# Patient Record
Sex: FEMALE | Race: WHITE | URBAN - METROPOLITAN AREA
[De-identification: names, ages, dates, MRNs, and addresses within clinical notes are randomized per-mention and may not be internally consistent; named-entity substitution may affect disease eponyms.]

---

## 2017-06-05 LAB
HPV ABSTRACT: NORMAL
PAP-ABSTRACT: NORMAL

## 2018-03-02 ENCOUNTER — TRANSFERRED RECORDS (OUTPATIENT)
Dept: HEALTH INFORMATION MANAGEMENT | Facility: CLINIC | Age: 33
End: 2018-03-02

## 2018-03-02 LAB
C TRACH DNA SPEC QL PROBE+SIG AMP: NEGATIVE
N GONORRHOEA DNA SPEC QL PROBE+SIG AMP: NEGATIVE
SPECIMEN DESCRIP: NORMAL
SPECIMEN DESCRIPTION: NORMAL

## 2018-04-27 ENCOUNTER — OFFICE VISIT (OUTPATIENT)
Dept: FAMILY MEDICINE | Facility: CLINIC | Age: 33
End: 2018-04-27
Payer: COMMERCIAL

## 2018-04-27 VITALS
HEART RATE: 77 BPM | HEIGHT: 68 IN | SYSTOLIC BLOOD PRESSURE: 110 MMHG | BODY MASS INDEX: 25.39 KG/M2 | TEMPERATURE: 97.8 F | WEIGHT: 167.5 LBS | RESPIRATION RATE: 16 BRPM | OXYGEN SATURATION: 98 % | DIASTOLIC BLOOD PRESSURE: 64 MMHG

## 2018-04-27 DIAGNOSIS — J20.9 ACUTE BRONCHITIS, UNSPECIFIED ORGANISM: Primary | ICD-10-CM

## 2018-04-27 PROCEDURE — 99213 OFFICE O/P EST LOW 20 MIN: CPT | Performed by: FAMILY MEDICINE

## 2018-04-27 RX ORDER — AZITHROMYCIN 250 MG/1
TABLET, FILM COATED ORAL
Qty: 6 TABLET | Refills: 0 | Status: SHIPPED | OUTPATIENT
Start: 2018-04-27

## 2018-04-27 NOTE — Clinical Note
Please abstract the following data from this visit with this patient into the appropriate field in Epic:  Pap smear done on this date: 6/5/17 (approximately), by this group: [facility], results were Normal.

## 2018-04-27 NOTE — PROGRESS NOTES
SUBJECTIVE:   Yomiara Ross is a 32 year old female who presents to clinic today for the following health issues:    Cough      Duration: x 3 weeks    Accompanying signs and symptoms: fatigue, and tired    Therapies tried and outcome: cough drop - helped a little.      Increased cough and cold symptoms x 3 weeks.  Symptoms were resolving than came back in past days with increased harsh cough and pain with coughing.  No fever or chills.   also at home with similar symptoms.  20 weeks pregnant- boy.  .      Problem list and histories reviewed & adjusted, as indicated.  Additional history: as documented    Patient Active Problem List   Diagnosis     Anxiety and depression     ALEXUS 3 - cervical intraepithelial neoplasia grade 3     Past Surgical History:   Procedure Laterality Date     LEEP TX, CERVICAL  5/15/2013       Social History   Substance Use Topics     Smoking status: Never Smoker     Smokeless tobacco: Never Used     Alcohol use 2.0 oz/week     4 Standard drinks or equivalent per week     Family History   Problem Relation Age of Onset     Depression Sister          Current Outpatient Prescriptions   Medication Sig Dispense Refill     azithromycin (ZITHROMAX) 250 MG tablet Two tablets first day, then one tablet daily for four days. 6 tablet 0     Prenatal Vit-Fe Fumarate-FA ([facility] PRENATAL) 28-0.8 MG TABS Take 1 tablet by mouth daily       buPROPion (WELLBUTRIN XL) 150 MG 24 hr tablet Take 1 tablet by mouth every morning. 90 tablet 1     No Known Allergies  BP Readings from Last 3 Encounters:   18 110/64   13 110/60    Wt Readings from Last 3 Encounters:   18 167 lb 8 oz (76 kg)   13 152 lb (68.9 kg)                    Reviewed and updated as needed this visit by clinical staff  Allergies  Meds       Reviewed and updated as needed this visit by Provider         ROS:  Constitutional, HEENT, cardiovascular, pulmonary, gi and gu systems are negative, except as otherwise  "noted.    OBJECTIVE:     /64  Pulse 77  Temp 97.8  F (36.6  C) (Oral)  Resp 16  Ht 5' 7.5\" (1.715 m)  Wt 167 lb 8 oz (76 kg)  SpO2 98%  BMI 25.85 kg/m2  Body mass index is 25.85 kg/(m^2).  GENERAL: healthy, alert and no distress  EYES: Eyes grossly normal to inspection, PERRL and conjunctivae and sclerae normal  NECK: no adenopathy, no asymmetry, masses, or scars and thyroid normal to palpation  RESP: lungs clear to auscultation - no rales, rhonchi or wheezes, harsh congested cough with few scattered rhonchi with cough only  CV: regular rate and rhythm, normal S1 S2, no S3 or S4, no murmur, click or rub, no peripheral edema and peripheral pulses strong  ABDOMEN: soft, nontender, no hepatosplenomegaly, no masses and bowel sounds normal  MS: no gross musculoskeletal defects noted, no edema  PSYCH: mentation appears normal, affect normal/bright    Diagnostic Test Results:  none     ASSESSMENT/PLAN:     1. Acute bronchitis, unspecified organism    - azithromycin (ZITHROMAX) 250 MG tablet; Two tablets first day, then one tablet daily for four days.  Dispense: 6 tablet; Refill: 0    Treat with Zpak.  Increase fluids.  FU if no change or worsening symptoms prn.    Regina Arzate MD  [facility]  "

## 2018-04-27 NOTE — MR AVS SNAPSHOT
"              After Visit Summary   2018    Yomaira Ross    MRN: [id number]           Patient Information     Date Of Birth          1985        Visit Information        Provider Department      2018 8:00 AM Regina Arzate MD [facility]        Today's Diagnoses     Acute bronchitis, unspecified organism    -  1       Follow-ups after your visit        Follow-up notes from your care team     Return if symptoms worsen or fail to improve.      Who to contact     If you have questions or need follow up information about today's clinic visit or your schedule please contact [facility] directly at [phone].  Normal or non-critical lab and imaging results will be communicated to you by MyChart, letter or phone within 4 business days after the clinic has received the results. If you do not hear from us within 7 days, please contact the clinic through MyChart or phone. If you have a critical or abnormal lab result, we will notify you by phone as soon as possible.  Submit refill requests through [employer] or call your pharmacy and they will forward the refill request to us. Please allow 3 business days for your refill to be completed.          Additional Information About Your Visit        MyChart Information     [employer] lets you send messages to your doctor, view your test results, renew your prescriptions, schedule appointments and more. To sign up, go to [email or web address] . Click on \"Log in\" on the left side of the screen, which will take you to the Welcome page. Then click on \"Sign up Now\" on the right side of the page.     You will be asked to enter the access code listed below, as well as some personal information. Please follow the directions to create your username and password.     Your access code is: [email or web address]  Expires: 2018  8:42 AM     Your access code will  in 90 days. If you need help or a new code, please call " "your [place] clinic or [phone].        Care EveryWhere ID     This is your Care EveryWhere ID. This could be used by other organizations to access your [place] medical records  [id number]        Your Vitals Were     Pulse Temperature Respirations Height Pulse Oximetry BMI (Body Mass Index)    77 97.8  F (36.6  C) (Oral) 16 5' 7.5\" (1.715 m) 98% 25.85 kg/m2       Blood Pressure from Last 3 Encounters:   04/27/18 110/64   07/08/13 110/60    Weight from Last 3 Encounters:   04/27/18 167 lb 8 oz (76 kg)   07/08/13 152 lb (68.9 kg)              Today, you had the following     No orders found for display         Today's Medication Changes          These changes are accurate as of 4/27/18  8:42 AM.  If you have any questions, ask your nurse or doctor.               Start taking these medicines.        Dose/Directions    azithromycin 250 MG tablet   Commonly known as:  ZITHROMAX   Used for:  Acute bronchitis, unspecified organism   Started by:  Regina Arzate MD        Two tablets first day, then one tablet daily for four days.   Quantity:  6 tablet   Refills:  0            Where to get your medicines      These medications were sent to [facility] [address] [place] [address] AT [address] [address]     Phone:  [phone]     azithromycin 250 MG tablet                Primary Care Provider Fax #    Physician No Ref-Primary [phone]       No address on file        Equal Access to Services     JUAN LUIS BAEZ : Sean lux Sosana, waaxda luqadaha, qaybta kaalmada chad, emmanuelle valentin. So [facility] [phone].    ATENCIÓN: Si habla español, tiene a latham disposición servicios gratuitos de asistencia lingüística. Llame al [phone].    We comply with applicable federal civil rights laws and [place] laws. We do not discriminate on the basis of race, color, national origin, " age, disability, sex, sexual orientation, or gender identity.            Thank you!     Thank you for choosing [facility]  for your care. Our goal is always to provide you with excellent care. Hearing back from our patients is one way we can continue to improve our services. Please take a few minutes to complete the written survey that you may receive in the mail after your visit with us. Thank you!             Your Updated Medication List - Protect others around you: Learn how to safely use, store and throw away your medicines at www.disposemymeds.org.          This list is accurate as of 4/27/18  8:42 AM.  Always use your most recent med list.                   Brand Name Dispense Instructions for use Diagnosis    azithromycin 250 MG tablet    ZITHROMAX    6 tablet    Two tablets first day, then one tablet daily for four days.    Acute bronchitis, unspecified organism       buPROPion 150 MG 24 hr tablet    WELLBUTRIN XL    90 tablet    Take 1 tablet by mouth every morning.    Anxiety and depression       [facility] PRENATAL 28-0.8 MG Tabs      Take 1 tablet by mouth daily

## 2019-06-19 ENCOUNTER — OFFICE VISIT (OUTPATIENT)
Dept: URGENT CARE | Facility: URGENT CARE | Age: 34
End: 2019-06-19
Payer: COMMERCIAL

## 2019-06-19 VITALS
TEMPERATURE: 98.1 F | HEART RATE: 77 BPM | BODY MASS INDEX: 23.92 KG/M2 | OXYGEN SATURATION: 97 % | WEIGHT: 155 LBS | SYSTOLIC BLOOD PRESSURE: 113 MMHG | DIASTOLIC BLOOD PRESSURE: 75 MMHG

## 2019-06-19 DIAGNOSIS — H66.002 NON-RECURRENT ACUTE SUPPURATIVE OTITIS MEDIA OF LEFT EAR WITHOUT SPONTANEOUS RUPTURE OF TYMPANIC MEMBRANE: Primary | ICD-10-CM

## 2019-06-19 PROCEDURE — 99213 OFFICE O/P EST LOW 20 MIN: CPT | Performed by: INTERNAL MEDICINE

## 2019-06-19 RX ORDER — AMOXICILLIN 500 MG/1
500 CAPSULE ORAL 3 TIMES DAILY
Qty: 30 CAPSULE | Refills: 0 | Status: SHIPPED | OUTPATIENT
Start: 2019-06-19 | End: 2019-06-29

## 2019-06-19 RX ORDER — FLUTICASONE PROPIONATE 50 MCG
1 SPRAY, SUSPENSION (ML) NASAL 2 TIMES DAILY
Qty: 16 G | Refills: 0 | Status: SHIPPED | OUTPATIENT
Start: 2019-06-19 | End: 2019-06-29

## 2019-06-19 ASSESSMENT — ENCOUNTER SYMPTOMS: FEVER: 0

## 2019-06-19 NOTE — PROGRESS NOTES
SUBJECTIVE:   Yomaira Richmond is a 33 year old female presenting with a chief complaint of   Chief Complaint   Patient presents with     Urgent Care     Ear Problem     c/o ear pain for 1 day       She is an established patient of [facility].    URI Adult    Onset of symptoms was 5 day(s) ago.  Course of illness is worsening.    Current and Associated symptoms: runny nose, stuffy nose, cough - non-productive, sore throat    and ear pain left today  Treatment measures tried include Tylenol/Ibuprofen and afrin.  Predisposing factors include ill contact: Family member  and .        Review of Systems   Constitutional: Negative for fever.       Past Medical History:   Diagnosis Date     ALEXUS 3 - cervical intraepithelial neoplasia grade 3 4/2013     Depression with anxiety 3/2013     Family History   Problem Relation Age of Onset     Depression Sister      Current Outpatient Medications   Medication Sig Dispense Refill     amoxicillin (AMOXIL) 500 MG capsule Take 1 capsule (500 mg) by mouth 3 times daily for 10 days 30 capsule 0     fluticasone (FLONASE) 50 MCG/ACT nasal spray Spray 1 spray into both nostrils 2 times daily for 10 days 16 g 0     azithromycin (ZITHROMAX) 250 MG tablet Two tablets first day, then one tablet daily for four days. (Patient not taking: Reported on 6/19/2019) 6 tablet 0     buPROPion (WELLBUTRIN XL) 150 MG 24 hr tablet Take 1 tablet by mouth every morning. 90 tablet 1     Prenatal Vit-Fe Fumarate-FA ([facility] PRENATAL) 28-0.8 MG TABS Take 1 tablet by mouth daily       Social History     Tobacco Use     Smoking status: Never Smoker     Smokeless tobacco: Never Used   Substance Use Topics     Alcohol use: Yes     Alcohol/week: 2.0 oz     Types: 4 Standard drinks or equivalent per week       OBJECTIVE  /75   Pulse 77   Temp 98.1  F (36.7  C) (Oral)   Wt 70.3 kg (155 lb)   LMP 06/05/2019   SpO2 97%   Breastfeeding? No   BMI 23.92 kg/m      Physical Exam   Constitutional: She appears  well-developed and well-nourished.   HENT:   Mouth/Throat: Oropharynx is clear and moist.   right tm - bubble on TM, red, slight yellow fluid  left tm normal    Cardiovascular: Normal rate, regular rhythm and normal heart sounds.   Pulmonary/Chest: Effort normal and breath sounds normal.   Lymphadenopathy:     She has no cervical adenopathy.   Vitals reviewed.      Labs:  No results found for this or any previous visit (from the past 24 hour(s)).        ASSESSMENT:      ICD-10-CM    1. Non-recurrent acute suppurative otitis media of left ear without spontaneous rupture of tympanic membrane H66.002 amoxicillin (AMOXIL) 500 MG capsule     fluticasone (FLONASE) 50 MCG/ACT nasal spray        PLAN:  amoxicillin  flonase    Followup:    If not improving or if condition worsens, follow up with your Primary Care Provider

## 2021-04-18 ENCOUNTER — HEALTH MAINTENANCE LETTER (OUTPATIENT)
Age: 36
End: 2021-04-18

## 2021-10-02 ENCOUNTER — HEALTH MAINTENANCE LETTER (OUTPATIENT)
Age: 36
End: 2021-10-02

## 2022-05-14 ENCOUNTER — HEALTH MAINTENANCE LETTER (OUTPATIENT)
Age: 37
End: 2022-05-14

## 2022-09-04 ENCOUNTER — HEALTH MAINTENANCE LETTER (OUTPATIENT)
Age: 37
End: 2022-09-04

## 2023-06-03 ENCOUNTER — HEALTH MAINTENANCE LETTER (OUTPATIENT)
Age: 38
End: 2023-06-03